# Patient Record
Sex: MALE | Race: WHITE | NOT HISPANIC OR LATINO | Employment: OTHER | URBAN - METROPOLITAN AREA
[De-identification: names, ages, dates, MRNs, and addresses within clinical notes are randomized per-mention and may not be internally consistent; named-entity substitution may affect disease eponyms.]

---

## 2023-09-05 PROBLEM — E55.9 MILD VITAMIN D DEFICIENCY: Status: ACTIVE | Noted: 2023-09-05

## 2023-09-05 PROBLEM — N41.9 PROSTATITIS: Status: ACTIVE | Noted: 2023-09-05

## 2023-09-05 PROBLEM — D50.9 ANEMIA, IRON DEFICIENCY: Status: ACTIVE | Noted: 2023-09-05

## 2023-09-05 PROBLEM — R55 EPISODE OF SYNCOPE: Status: ACTIVE | Noted: 2023-09-05

## 2023-09-05 PROBLEM — M17.0 DEGENERATIVE ARTHRITIS OF KNEE, BILATERAL: Status: ACTIVE | Noted: 2023-09-05

## 2023-09-05 PROBLEM — F41.9 ANXIETY: Status: ACTIVE | Noted: 2023-09-05

## 2023-09-05 PROBLEM — N40.0 BPH (BENIGN PROSTATIC HYPERPLASIA): Status: ACTIVE | Noted: 2023-09-05

## 2023-09-05 PROBLEM — R53.81 MALAISE AND FATIGUE: Status: ACTIVE | Noted: 2023-09-05

## 2023-09-05 PROBLEM — G56.21 CUBITAL TUNNEL SYNDROME ON RIGHT: Status: ACTIVE | Noted: 2023-09-05

## 2023-09-05 PROBLEM — I10 HYPERTENSION: Status: ACTIVE | Noted: 2023-09-05

## 2023-09-05 PROBLEM — E78.00 PURE HYPERCHOLESTEROLEMIA: Status: ACTIVE | Noted: 2023-09-05

## 2023-09-05 PROBLEM — I49.3 FREQUENT PVCS: Status: ACTIVE | Noted: 2023-09-05

## 2023-09-05 PROBLEM — M54.12 CERVICAL NEURITIS: Status: ACTIVE | Noted: 2023-09-05

## 2023-09-05 PROBLEM — G56.01 MILD CARPAL TUNNEL SYNDROME, RIGHT: Status: ACTIVE | Noted: 2023-09-05

## 2023-09-05 PROBLEM — R78.81 GRAM-NEGATIVE BACTEREMIA: Status: ACTIVE | Noted: 2023-09-05

## 2023-09-05 PROBLEM — M19.90 INFLAMMATORY ARTHRITIS: Status: ACTIVE | Noted: 2023-09-05

## 2023-09-05 PROBLEM — G56.31 NEUROPATHY OF RIGHT RADIAL NERVE: Status: ACTIVE | Noted: 2023-09-05

## 2023-09-05 PROBLEM — R53.83 MALAISE AND FATIGUE: Status: ACTIVE | Noted: 2023-09-05

## 2023-09-05 PROBLEM — K21.9 GERD (GASTROESOPHAGEAL REFLUX DISEASE): Status: ACTIVE | Noted: 2023-09-05

## 2023-09-05 RX ORDER — METHYLPREDNISOLONE ACETATE 40 MG/ML
INJECTION, SUSPENSION INTRA-ARTICULAR; INTRALESIONAL; INTRAMUSCULAR; SOFT TISSUE
COMMUNITY
Start: 2017-05-24 | End: 2023-10-11 | Stop reason: ALTCHOICE

## 2023-09-05 RX ORDER — LOSARTAN POTASSIUM 50 MG/1
1 TABLET ORAL DAILY
COMMUNITY
End: 2023-10-11 | Stop reason: ALTCHOICE

## 2023-09-05 RX ORDER — TAMSULOSIN HYDROCHLORIDE 0.4 MG/1
1 CAPSULE ORAL DAILY
COMMUNITY
Start: 2015-06-19

## 2023-09-05 RX ORDER — ACETAMINOPHEN 500 MG
TABLET ORAL
COMMUNITY
Start: 2021-06-18 | End: 2023-10-11 | Stop reason: ALTCHOICE

## 2023-09-05 RX ORDER — LISINOPRIL 5 MG/1
1 TABLET ORAL DAILY
COMMUNITY
Start: 2021-09-07 | End: 2023-10-11 | Stop reason: ALTCHOICE

## 2023-09-05 RX ORDER — NAPROXEN SODIUM 220 MG/1
TABLET, FILM COATED ORAL
COMMUNITY
Start: 2020-03-27 | End: 2023-10-11

## 2023-09-05 RX ORDER — SIMVASTATIN 20 MG/1
1 TABLET, FILM COATED ORAL DAILY
COMMUNITY
Start: 2015-02-19 | End: 2024-04-09 | Stop reason: WASHOUT

## 2023-09-05 RX ORDER — FUROSEMIDE 40 MG/1
1 TABLET ORAL DAILY
COMMUNITY
Start: 2022-02-10 | End: 2024-04-09 | Stop reason: SDUPTHER

## 2023-09-05 RX ORDER — GABAPENTIN 100 MG/1
CAPSULE ORAL
COMMUNITY
Start: 2021-02-26 | End: 2023-10-11 | Stop reason: ALTCHOICE

## 2023-09-05 RX ORDER — DEXAMETHASONE SODIUM PHOSPHATE 4 MG/ML
INJECTION, SOLUTION INTRA-ARTICULAR; INTRALESIONAL; INTRAMUSCULAR; INTRAVENOUS; SOFT TISSUE
COMMUNITY
Start: 2018-04-13 | End: 2023-10-11 | Stop reason: ALTCHOICE

## 2023-09-05 RX ORDER — METHOCARBAMOL 500 MG/1
TABLET, FILM COATED ORAL
COMMUNITY
Start: 2017-05-22 | End: 2023-10-11 | Stop reason: ALTCHOICE

## 2023-09-05 RX ORDER — CHOLECALCIFEROL (VITAMIN D3) 125 MCG
CAPSULE ORAL
COMMUNITY
End: 2023-09-06 | Stop reason: ENTERED-IN-ERROR

## 2023-10-11 ENCOUNTER — OFFICE VISIT (OUTPATIENT)
Dept: CARDIOLOGY | Facility: CLINIC | Age: 83
End: 2023-10-11
Payer: COMMERCIAL

## 2023-10-11 VITALS
HEIGHT: 68 IN | BODY MASS INDEX: 29.95 KG/M2 | HEART RATE: 54 BPM | DIASTOLIC BLOOD PRESSURE: 60 MMHG | WEIGHT: 197.6 LBS | SYSTOLIC BLOOD PRESSURE: 118 MMHG

## 2023-10-11 DIAGNOSIS — Z91.89 AT RISK FOR AMIODARONE TOXICITY WITH LONG TERM USE: ICD-10-CM

## 2023-10-11 DIAGNOSIS — Z79.899 AT RISK FOR AMIODARONE TOXICITY WITH LONG TERM USE: ICD-10-CM

## 2023-10-11 DIAGNOSIS — E78.00 PURE HYPERCHOLESTEROLEMIA: ICD-10-CM

## 2023-10-11 DIAGNOSIS — I10 PRIMARY HYPERTENSION: Primary | ICD-10-CM

## 2023-10-11 DIAGNOSIS — R06.02 SHORTNESS OF BREATH: ICD-10-CM

## 2023-10-11 DIAGNOSIS — I49.3 FREQUENT PVCS: ICD-10-CM

## 2023-10-11 PROBLEM — R29.898 RIGHT ARM WEAKNESS: Status: ACTIVE | Noted: 2019-08-30

## 2023-10-11 PROCEDURE — 1159F MED LIST DOCD IN RCRD: CPT | Performed by: INTERNAL MEDICINE

## 2023-10-11 PROCEDURE — 1160F RVW MEDS BY RX/DR IN RCRD: CPT | Performed by: INTERNAL MEDICINE

## 2023-10-11 PROCEDURE — 3074F SYST BP LT 130 MM HG: CPT | Performed by: INTERNAL MEDICINE

## 2023-10-11 PROCEDURE — 93000 ELECTROCARDIOGRAM COMPLETE: CPT | Performed by: INTERNAL MEDICINE

## 2023-10-11 PROCEDURE — 99215 OFFICE O/P EST HI 40 MIN: CPT | Performed by: INTERNAL MEDICINE

## 2023-10-11 PROCEDURE — 3078F DIAST BP <80 MM HG: CPT | Performed by: INTERNAL MEDICINE

## 2023-10-11 PROCEDURE — 1036F TOBACCO NON-USER: CPT | Performed by: INTERNAL MEDICINE

## 2023-10-11 RX ORDER — DICLOFENAC SODIUM 10 MG/G
100 GEL TOPICAL 2 TIMES DAILY PRN
COMMUNITY
Start: 2023-06-22

## 2023-10-11 RX ORDER — CARVEDILOL 3.12 MG/1
3.12 TABLET ORAL 2 TIMES DAILY
Qty: 180 TABLET | Refills: 3 | Status: SHIPPED | OUTPATIENT
Start: 2023-10-11 | End: 2024-04-09 | Stop reason: SDUPTHER

## 2023-10-11 RX ORDER — ASPIRIN 81 MG/1
81 TABLET ORAL DAILY
COMMUNITY
Start: 2023-04-06 | End: 2023-10-11

## 2023-10-11 ASSESSMENT — ENCOUNTER SYMPTOMS
DEPRESSION: 0
OCCASIONAL FEELINGS OF UNSTEADINESS: 1
LOSS OF SENSATION IN FEET: 0

## 2023-10-11 NOTE — ASSESSMENT & PLAN NOTE
This is currently well controlled.  I am going to reduce the dose of Coreg as he is somewhat bradycardic today and blood pressure is on the low side.  His target blood pressure is less than 130/80.

## 2023-10-11 NOTE — ASSESSMENT & PLAN NOTE
He was highly symptomatic with the PVCs with structurally normal heart and without LV dysfunction or ischemic heart disease.  Couple of hospitalization because of these symptoms we started him on a diuretic and amiodarone and this led to symptom resolution.  Continue current management.

## 2023-10-11 NOTE — PROGRESS NOTES
"Chief Complaint:   Follow-up (annual)     History Of Present Illness:    Marko Kay is a 83 y.o. male who was seen in the hospital for near syncope and shortness of breath.  Had a follow-up monitor that showed high frequency of PVCs but no other arrhythmia. I believe he had 16 to 18% burden of PVCs. However stress test echo were within normal limits.  We started him on a low-dose diuretic and amiodarone and this has led to symptom resolution.     Is here for follow-up.   ECG shows sinus bradycardia.  QTc 424 ms  Last Recorded Vitals:  Vitals:    10/11/23 1103   BP: 118/60   BP Location: Left arm   Pulse: 54   Weight: 89.6 kg (197 lb 9.6 oz)   Height: 1.727 m (5' 8\")       Past Medical History:  He has a past medical history of Dizziness and giddiness, Joint disorder, unspecified, Overweight, Personal history of other diseases of the musculoskeletal system and connective tissue (03/18/2015), Personal history of other diseases of the musculoskeletal system and connective tissue, Personal history of other endocrine, nutritional and metabolic disease (03/18/2015), and Unspecified acquired deformity of unspecified upper arm.    Past Surgical History:  He has a past surgical history that includes Cataract extraction (11/26/2014).      Social History:  He reports that he has never smoked. He has never used smokeless tobacco. He reports that he does not drink alcohol and does not use drugs.    Family History:  Family History   Problem Relation Name Age of Onset    Cancer Other Uncle         Allergies:  Patient has no known allergies.    Outpatient Medications:  Current Outpatient Medications   Medication Instructions    amiodarone (Pacerone) 200 mg tablet TAKE 2 TABLETS BY MOUTH EVERY 12 HOURS    aspirin 81 mg, oral, Daily    carvedilol (Coreg) 6.25 mg tablet TAKE 1 TABLET BY MOUTH TWO TIMES A DAY    diclofenac sodium (VOLTAREN) 100 g, Topical, 2 times daily PRN    furosemide (Lasix) 40 mg tablet 1 tablet, oral, Daily "    simvastatin (Zocor) 20 mg tablet 1 tablet, oral, Daily    tamsulosin (Flomax) 0.4 mg 24 hr capsule 1 capsule, oral, Daily       Physical Exam:  Physical Exam  Vitals reviewed.   Constitutional:       Appearance: Normal appearance.   Neck:      Vascular: No carotid bruit or JVD.   Cardiovascular:      Rate and Rhythm: Normal rate and regular rhythm.      Heart sounds: Normal heart sounds, S1 normal and S2 normal. No murmur heard.  Pulmonary:      Effort: Pulmonary effort is normal.      Breath sounds: Normal breath sounds.   Abdominal:      General: Abdomen is flat. Bowel sounds are normal.      Palpations: Abdomen is soft.   Musculoskeletal:      Right lower leg: No edema.      Left lower leg: No edema.   Skin:     General: Skin is warm.   Neurological:      Mental Status: He is alert. Mental status is at baseline.   Psychiatric:         Mood and Affect: Mood normal.         Behavior: Behavior normal.           Last Labs:  CBC -  Lab Results   Component Value Date    WBC 6.9 07/15/2023    HGB 13.8 07/15/2023    HCT 40.6 (L) 07/15/2023    MCV 94 07/15/2023     07/15/2023       CMP -  Lab Results   Component Value Date    CALCIUM 8.2 (L) 07/15/2023    PROT 6.3 (L) 07/11/2023    ALBUMIN 4.0 07/11/2023    AST 16 07/11/2023    ALT 20 07/11/2023    ALKPHOS 70 07/11/2023    BILITOT 0.9 07/11/2023       LIPID PANEL -   Lab Results   Component Value Date    CHOL 104 07/13/2023    TRIG 103 07/13/2023    HDL 35.9 (A) 07/13/2023    CHHDL 2.9 07/13/2023    LDLF 48 07/13/2023    VLDL 21 07/13/2023       RENAL FUNCTION PANEL -   Lab Results   Component Value Date    GLUCOSE 102 (H) 07/15/2023     07/15/2023    K 4.2 07/15/2023     (H) 07/15/2023    CO2 22 07/15/2023    ANIONGAP 11 07/15/2023    BUN 29 (H) 07/15/2023    CREATININE 1.06 07/15/2023    GFRMALE 69 07/15/2023    CALCIUM 8.2 (L) 07/15/2023    ALBUMIN 4.0 07/11/2023        Lab Results   Component Value Date    BNP 49 07/11/2023       Assessment/Plan    Problem List Items Addressed This Visit             ICD-10-CM    Hypertension - Primary I10     This is currently well controlled.  I am going to reduce the dose of Coreg as he is somewhat bradycardic today and blood pressure is on the low side.  His target blood pressure is less than 130/80.         Relevant Medications    carvedilol (Coreg) 3.125 mg tablet    Other Relevant Orders    ECG 12 Lead    Frequent PVCs I49.3     He was highly symptomatic with the PVCs with structurally normal heart and without LV dysfunction or ischemic heart disease.  Couple of hospitalization because of these symptoms we started him on a diuretic and amiodarone and this led to symptom resolution.  Continue current management.         Relevant Medications    carvedilol (Coreg) 3.125 mg tablet    Other Relevant Orders    ECG 12 Lead    Pure hypercholesterolemia E78.00     We will continue current dose of statins.  No prior ischemic event.  He has been on this medication for a while perhaps started by PCP.  Will continue.  Lipids are favorable.  I stopped the aspirin.         Relevant Orders    ECG 12 Lead    At risk for amiodarone toxicity with long term use Z91.89, Z79.899     We discussed long-term toxicities of the medication and the need for monitoring.  Close follow-up in office.         Relevant Medications    carvedilol (Coreg) 3.125 mg tablet    Other Relevant Orders    ECG 12 Lead    Shortness of breath R06.02     He had history of shortness of breath the etiology of which was not completely clear to us.  Could have been a combination of uncontrolled hypertension, diastolic heart failure or PVCs.  This has responded nicely with treatment with Lasix and amiodarone.  We will continue current regimen.  Blood pressure is currently well controlled we are reducing Coreg as above.              Bertha Zarco MD

## 2023-10-11 NOTE — ASSESSMENT & PLAN NOTE
He had history of shortness of breath the etiology of which was not completely clear to us.  Could have been a combination of uncontrolled hypertension, diastolic heart failure or PVCs.  This has responded nicely with treatment with Lasix and amiodarone.  We will continue current regimen.  Blood pressure is currently well controlled we are reducing Coreg as above.

## 2023-10-11 NOTE — ASSESSMENT & PLAN NOTE
We will continue current dose of statins.  No prior ischemic event.  He has been on this medication for a while perhaps started by PCP.  Will continue.  Lipids are favorable.  I stopped the aspirin.

## 2023-10-11 NOTE — ASSESSMENT & PLAN NOTE
We discussed long-term toxicities of the medication and the need for monitoring.  Close follow-up in office.

## 2023-10-11 NOTE — PATIENT INSTRUCTIONS
You are on amiodarone . This medicine is helping you stay in normal rhythm.  This is an important medication for you. However, when used over a long period of time can deposit in your liver, thyroid gland, lungs, eyes and cause problems.  It is important that we monitor you periodically for these above-mentioned side effects.  We will be doing periodic blood work and chest x-rays.  It is important that you see your eye doctor at least once a year and mention to him/her that you take amiodarone.

## 2024-04-02 PROBLEM — N39.0 LOWER URINARY TRACT INFECTIOUS DISEASE: Status: ACTIVE | Noted: 2024-04-02

## 2024-04-02 PROBLEM — M25.461 EFFUSION OF RIGHT KNEE: Status: ACTIVE | Noted: 2024-04-02

## 2024-04-02 PROBLEM — E66.3 OVERWEIGHT WITH BODY MASS INDEX (BMI) 25.0-29.9: Status: ACTIVE | Noted: 2024-04-02

## 2024-04-02 PROBLEM — R00.2 PALPITATIONS: Status: ACTIVE | Noted: 2024-04-02

## 2024-04-02 PROBLEM — R55 SYNCOPE: Status: ACTIVE | Noted: 2023-07-15

## 2024-04-02 PROBLEM — G56.01 CARPAL TUNNEL SYNDROME OF RIGHT WRIST: Status: ACTIVE | Noted: 2019-08-23

## 2024-04-02 PROBLEM — J40 BRONCHITIS, NOT SPECIFIED AS ACUTE OR CHRONIC: Status: ACTIVE | Noted: 2018-12-28

## 2024-04-02 PROBLEM — M54.2 NECK PAIN: Status: ACTIVE | Noted: 2023-09-05

## 2024-04-02 PROBLEM — N40.0 BENIGN PROSTATIC HYPERPLASIA: Status: ACTIVE | Noted: 2023-07-15

## 2024-04-02 PROBLEM — R05.9 COUGH: Status: ACTIVE | Noted: 2018-12-28

## 2024-04-02 PROBLEM — M25.529 ELBOW PAIN: Status: ACTIVE | Noted: 2024-04-02

## 2024-04-02 PROBLEM — M79.631 PAIN OF RIGHT FOREARM: Status: ACTIVE | Noted: 2024-04-02

## 2024-04-02 PROBLEM — Z86.39 HISTORY OF ELEVATED LIPIDS: Status: ACTIVE | Noted: 2024-04-02

## 2024-04-02 PROBLEM — M77.9 CAPSULITIS: Status: ACTIVE | Noted: 2024-04-02

## 2024-04-02 PROBLEM — Z86.16 PERSONAL HISTORY OF COVID-19: Status: ACTIVE | Noted: 2023-07-15

## 2024-04-02 PROBLEM — M25.462 EFFUSION OF LEFT KNEE: Status: ACTIVE | Noted: 2024-04-02

## 2024-04-02 PROBLEM — R09.89 PULMONARY CONGESTION: Status: ACTIVE | Noted: 2024-04-02

## 2024-04-02 PROBLEM — R42 DIZZINESS AND GIDDINESS: Status: ACTIVE | Noted: 2024-04-02

## 2024-04-02 PROBLEM — M19.90 ARTHRITIS: Status: ACTIVE | Noted: 2023-07-15

## 2024-04-08 NOTE — PROGRESS NOTES
Primary Cardiologist: Dr. Zarco    Chief Complaint:   Follow-up (6 month f/u)     History Of Present Illness:    Marko Kay is a 84 y.o. male with past medical history of HTN, DLD, PVCs, history of ventricular bigeminy with associated presyncope who is on amiodarone presents today for 6 month follow up.     Today, Marko Kay is feeling very well. He denies any cardiac concerns, no recent hospitalizations.   Denies chest pain/pressure, no dizziness or lightheadedness, no shortness of breath at rest but occasionally with exertion that has been stable, and no palpitations. He denies lower extremity edema, orthopnea or PND.     Last Recorded Vitals:  Visit Vitals  /68 (BP Location: Left arm)   Pulse 60   Smoking Status Never        Past Medical History:  He has a past medical history of Dizziness and giddiness, Joint disorder, unspecified, Overweight, Personal history of other diseases of the musculoskeletal system and connective tissue (03/18/2015), Personal history of other diseases of the musculoskeletal system and connective tissue, Personal history of other endocrine, nutritional and metabolic disease (03/18/2015), and Unspecified acquired deformity of unspecified upper arm.    Past Surgical History:  He has a past surgical history that includes Cataract extraction (11/26/2014).      Social History:  He reports that he has never smoked. He has never used smokeless tobacco. He reports that he does not drink alcohol and does not use drugs.    Family History:  Family History   Problem Relation Name Age of Onset    Cancer Other Uncle         Allergies:  Patient has no known allergies.    Outpatient Medications:  Current Outpatient Medications   Medication Instructions    amiodarone (Pacerone) 200 mg tablet TAKE 2 TABLETS BY MOUTH EVERY 12 HOURS    atorvastatin (LIPITOR) 20 mg    calcium citrate 250 mg calcium tablet     carvedilol (COREG) 3.125 mg, oral, 2 times daily    cyanocobalamin (VITAMIN  B-12) 1,000 mcg, oral, Daily    diclofenac sodium (VOLTAREN) 100 g, Topical, 2 times daily PRN    furosemide (Lasix) 40 mg tablet 1 tablet, oral, Daily    losartan (Cozaar) 25 mg tablet     melatonin 10 mg, oral, Nightly    simvastatin (Zocor) 20 mg tablet 1 tablet, oral, Daily    tamsulosin (Flomax) 0.4 mg 24 hr capsule 1 capsule, oral, Daily         Review of Systems   Constitutional: Negative for malaise/fatigue.   HENT: Negative.     Cardiovascular:  Positive for dyspnea on exertion (chronic, stable). Negative for chest pain, leg swelling, orthopnea and palpitations.   Respiratory:  Negative for shortness of breath.    Endocrine: Negative.    Hematologic/Lymphatic: Bruises/bleeds easily.   Skin: Negative.    Musculoskeletal:         Using rollator   Gastrointestinal: Negative.    Genitourinary: Negative.    Neurological:  Positive for dizziness (chronic, stable).        Physical Exam  Vitals reviewed.   Constitutional:       Appearance: Normal appearance.   HENT:      Head: Normocephalic.      Mouth/Throat:      Mouth: Mucous membranes are moist.   Cardiovascular:      Rate and Rhythm: Normal rate and regular rhythm.      Pulses: Normal pulses.      Heart sounds: Normal heart sounds.   Pulmonary:      Effort: Pulmonary effort is normal.      Breath sounds: Normal breath sounds. No wheezing, rhonchi or rales.   Abdominal:      General: Bowel sounds are normal. There is no distension.      Palpations: Abdomen is soft.      Tenderness: There is no abdominal tenderness.   Musculoskeletal:      Cervical back: Normal range of motion.      Right lower leg: Edema (trace) present.      Left lower leg: Edema (trace) present.   Skin:     General: Skin is warm and dry.   Neurological:      General: No focal deficit present.      Mental Status: He is alert and oriented to person, place, and time.   Psychiatric:         Mood and Affect: Mood normal.         Behavior: Behavior normal.           Last Labs:  CBC -  Lab Results  "  Component Value Date    WBC 6.9 07/15/2023    HGB 13.8 07/15/2023    HCT 40.6 (L) 07/15/2023    MCV 94 07/15/2023     07/15/2023       CMP -  Lab Results   Component Value Date    CALCIUM 8.2 (L) 07/15/2023    PROT 6.3 (L) 07/11/2023    ALBUMIN 4.0 07/11/2023    AST 16 07/11/2023    ALT 20 07/11/2023    ALKPHOS 70 07/11/2023    BILITOT 0.9 07/11/2023       LIPID PANEL -   Lab Results   Component Value Date    CHOL 104 07/13/2023    TRIG 103 07/13/2023    HDL 35.9 (A) 07/13/2023    CHHDL 2.9 07/13/2023    LDLF 48 07/13/2023    VLDL 21 07/13/2023       RENAL FUNCTION PANEL -   Lab Results   Component Value Date    GLUCOSE 102 (H) 07/15/2023     07/15/2023    K 4.2 07/15/2023     (H) 07/15/2023    CO2 22 07/15/2023    ANIONGAP 11 07/15/2023    BUN 29 (H) 07/15/2023    CREATININE 1.06 07/15/2023    GFRMALE 69 07/15/2023    CALCIUM 8.2 (L) 07/15/2023    ALBUMIN 4.0 07/11/2023        Lab Results   Component Value Date    BNP 49 07/11/2023       Last Cardiology Tests:  ECG:  Sinus Rhythm rate of 60 bpm, qt/qtc 426 ms. This will go to Dr. Zarco for review.       Echo:  TTE  7/12/23:  Left ventricular systolic function is normal with a 55% estimated ejection fraction.     Ejection Fractions:  No results found for: \"EF\"    Cath:  [facility] 7/14/23:  Mild non-obstructive coronary artery disease.   Stress Test:  NM Stress Test  IMPRESSION:  1. Fixed perfusion defect as noted above. No reversible perfusion  defects seen. There is a low probability of ischemia.  2. Calculated ejection fraction 54% without segmental wall motion  abnormality seen.  Cardiac Imaging:  No results found for this or any previous visit from the past 1095 days.        Lab review: I have personally reviewed the laboratory result(s)     Assessment/Plan     Marko Kay is a 84 y.o. male with past medical history of HTN, DLD, PVCs, history of ventricular bigeminy with associated presyncope who is on amiodarone presents today for 6 " month follow up.     History of PVCs/Ventricular Bigeminy  With associated presyncope  TTE  7/12/23: Left ventricular systolic function is normal with a 55% estimated ejection fraction.  [facility] 7/14/23: Mild non-obstructive coronary artery disease.   History of symptomatic PVCs that resolved with diuretic and amiodarone  Repeat labs done 4/5 reviewed. AST/ALT, CR and TSH normal. CXR done 4/4 read as normal, no acute findings  Today, denies any palpitations.   Continue on Amiodarone, carvedilol  6 month Amio labs ordered prior to next visit.     Hypertension  Today's /68  Goal BP less than 130/80  Continue on current antihypertensives    Dyslipidemia  Lipid panel 7/13/2023: Cholesterol 104 HDL 35 LDL 48 and triglycerides of 103  Continue on statin    History of shortness of breath  Stable, controlled  No significant edema noted  Continue on furosemide, stable kidney function and potassium on recent labs    Maxine Lagunas, APRN-CNP

## 2024-04-09 ENCOUNTER — APPOINTMENT (OUTPATIENT)
Dept: CARDIOLOGY | Facility: CLINIC | Age: 84
End: 2024-04-09
Payer: COMMERCIAL

## 2024-04-09 ENCOUNTER — OFFICE VISIT (OUTPATIENT)
Dept: CARDIOLOGY | Facility: CLINIC | Age: 84
End: 2024-04-09
Payer: COMMERCIAL

## 2024-04-09 VITALS — HEART RATE: 60 BPM | SYSTOLIC BLOOD PRESSURE: 128 MMHG | DIASTOLIC BLOOD PRESSURE: 68 MMHG

## 2024-04-09 DIAGNOSIS — I49.3 FREQUENT PVCS: ICD-10-CM

## 2024-04-09 DIAGNOSIS — Z91.89 AT RISK FOR AMIODARONE TOXICITY WITH LONG TERM USE: ICD-10-CM

## 2024-04-09 DIAGNOSIS — Z79.899 AT RISK FOR AMIODARONE TOXICITY WITH LONG TERM USE: ICD-10-CM

## 2024-04-09 DIAGNOSIS — E78.00 PURE HYPERCHOLESTEROLEMIA: ICD-10-CM

## 2024-04-09 DIAGNOSIS — I10 PRIMARY HYPERTENSION: Primary | ICD-10-CM

## 2024-04-09 DIAGNOSIS — Z86.39 HISTORY OF ELEVATED LIPIDS: ICD-10-CM

## 2024-04-09 PROCEDURE — 1036F TOBACCO NON-USER: CPT | Performed by: CLINICAL NURSE SPECIALIST

## 2024-04-09 PROCEDURE — 1160F RVW MEDS BY RX/DR IN RCRD: CPT | Performed by: CLINICAL NURSE SPECIALIST

## 2024-04-09 PROCEDURE — 99214 OFFICE O/P EST MOD 30 MIN: CPT | Performed by: CLINICAL NURSE SPECIALIST

## 2024-04-09 PROCEDURE — 1159F MED LIST DOCD IN RCRD: CPT | Performed by: CLINICAL NURSE SPECIALIST

## 2024-04-09 PROCEDURE — 3074F SYST BP LT 130 MM HG: CPT | Performed by: CLINICAL NURSE SPECIALIST

## 2024-04-09 PROCEDURE — 93000 ELECTROCARDIOGRAM COMPLETE: CPT | Performed by: INTERNAL MEDICINE

## 2024-04-09 PROCEDURE — 3078F DIAST BP <80 MM HG: CPT | Performed by: CLINICAL NURSE SPECIALIST

## 2024-04-09 RX ORDER — ATORVASTATIN CALCIUM 20 MG/1
20 TABLET, FILM COATED ORAL
COMMUNITY
Start: 2024-03-05 | End: 2024-04-09 | Stop reason: SDUPTHER

## 2024-04-09 RX ORDER — ACETAMINOPHEN, DIPHENHYDRAMINE HCL, PHENYLEPHRINE HCL 325; 25; 5 MG/1; MG/1; MG/1
10 TABLET ORAL NIGHTLY
COMMUNITY

## 2024-04-09 RX ORDER — ATORVASTATIN CALCIUM 20 MG/1
20 TABLET, FILM COATED ORAL DAILY
Qty: 90 TABLET | Refills: 3 | Status: SHIPPED | OUTPATIENT
Start: 2024-04-09 | End: 2025-04-09

## 2024-04-09 RX ORDER — FUROSEMIDE 40 MG/1
40 TABLET ORAL DAILY
Qty: 90 TABLET | Refills: 3 | Status: SHIPPED | OUTPATIENT
Start: 2024-04-09 | End: 2025-04-09

## 2024-04-09 RX ORDER — CARVEDILOL 3.12 MG/1
3.12 TABLET ORAL 2 TIMES DAILY
Qty: 180 TABLET | Refills: 3 | Status: SHIPPED | OUTPATIENT
Start: 2024-04-09 | End: 2025-04-09

## 2024-04-09 RX ORDER — LANOLIN ALCOHOL/MO/W.PET/CERES
1000 CREAM (GRAM) TOPICAL DAILY
COMMUNITY

## 2024-04-09 RX ORDER — AMIODARONE HYDROCHLORIDE 200 MG/1
200 TABLET ORAL DAILY
Qty: 90 TABLET | Refills: 3 | Status: SHIPPED | OUTPATIENT
Start: 2024-04-09 | End: 2025-04-09

## 2024-04-09 RX ORDER — PSEUDOEPHEDRINE HCL 30 MG
TABLET ORAL
COMMUNITY
Start: 2024-03-05

## 2024-04-09 RX ORDER — LOSARTAN POTASSIUM 25 MG/1
25 TABLET ORAL 2 TIMES DAILY
Qty: 180 TABLET | Refills: 3 | Status: SHIPPED | OUTPATIENT
Start: 2024-04-09 | End: 2025-04-09

## 2024-04-09 RX ORDER — LOSARTAN POTASSIUM 25 MG/1
TABLET ORAL
COMMUNITY
Start: 2024-03-05 | End: 2024-04-09 | Stop reason: SDUPTHER

## 2024-04-09 ASSESSMENT — ENCOUNTER SYMPTOMS
ENDOCRINE NEGATIVE: 1
GASTROINTESTINAL NEGATIVE: 1
OCCASIONAL FEELINGS OF UNSTEADINESS: 1
BRUISES/BLEEDS EASILY: 1
DIZZINESS: 1
ORTHOPNEA: 0
LOSS OF SENSATION IN FEET: 0
DEPRESSION: 0
PALPITATIONS: 0
SHORTNESS OF BREATH: 0
DYSPNEA ON EXERTION: 1

## 2024-04-09 NOTE — PATIENT INSTRUCTIONS
Labs have been ordered to be done prior to your next appointment in October  Call our office with any new cardiac concerns  Continue current medications  Continue heart-healthy diet. A diet low in sodium, low in cholesterol, limiting red meats and eating whole foods.   Follow up with Dr. Zarco in October as scheduled.

## 2024-10-08 ENCOUNTER — TELEPHONE (OUTPATIENT)
Dept: CARDIOLOGY | Facility: HOSPITAL | Age: 84
End: 2024-10-08
Payer: COMMERCIAL

## 2024-10-08 NOTE — TELEPHONE ENCOUNTER
We had a voicemail form his nurse there.  He thinks he has an appointment with Dr. Zarco on 10/16 and he needs a chest xray.  Looks like his visit was cancelled back in April.  He is due for a yearly visit with Dr. Zarco and is also due for a yearly chest xray.  He will get rescheduled.

## 2024-10-09 NOTE — TELEPHONE ENCOUNTER
Called [facility] to let them know we got him scheduled for 10/15 @ 1:40 pm.

## 2024-10-15 ENCOUNTER — OFFICE VISIT (OUTPATIENT)
Dept: CARDIOLOGY | Facility: HOSPITAL | Age: 84
End: 2024-10-15
Payer: COMMERCIAL

## 2024-10-15 VITALS
WEIGHT: 196.2 LBS | HEIGHT: 68 IN | BODY MASS INDEX: 29.73 KG/M2 | DIASTOLIC BLOOD PRESSURE: 78 MMHG | SYSTOLIC BLOOD PRESSURE: 134 MMHG | HEART RATE: 56 BPM

## 2024-10-15 DIAGNOSIS — I49.3 FREQUENT PVCS: Primary | ICD-10-CM

## 2024-10-15 DIAGNOSIS — E78.00 PURE HYPERCHOLESTEROLEMIA: ICD-10-CM

## 2024-10-15 DIAGNOSIS — Z79.899 AT RISK FOR AMIODARONE TOXICITY WITH LONG TERM USE: ICD-10-CM

## 2024-10-15 DIAGNOSIS — Z79.899 ON AMIODARONE THERAPY: ICD-10-CM

## 2024-10-15 DIAGNOSIS — I10 PRIMARY HYPERTENSION: ICD-10-CM

## 2024-10-15 DIAGNOSIS — Z91.89 AT RISK FOR AMIODARONE TOXICITY WITH LONG TERM USE: ICD-10-CM

## 2024-10-15 PROCEDURE — 99215 OFFICE O/P EST HI 40 MIN: CPT | Performed by: INTERNAL MEDICINE

## 2024-10-15 PROCEDURE — 93005 ELECTROCARDIOGRAM TRACING: CPT | Performed by: INTERNAL MEDICINE

## 2024-10-15 RX ORDER — FUROSEMIDE 20 MG/1
20 TABLET ORAL DAILY
Qty: 90 TABLET | Refills: 3 | Status: SHIPPED | OUTPATIENT
Start: 2024-10-15 | End: 2024-10-15 | Stop reason: SDUPTHER

## 2024-10-15 RX ORDER — FUROSEMIDE 20 MG/1
20 TABLET ORAL DAILY
Qty: 90 TABLET | Refills: 3 | Status: SHIPPED | OUTPATIENT
Start: 2024-10-15 | End: 2025-10-15

## 2024-10-15 ASSESSMENT — ENCOUNTER SYMPTOMS
OCCASIONAL FEELINGS OF UNSTEADINESS: 1
DEPRESSION: 0
LOSS OF SENSATION IN FEET: 0

## 2024-10-15 NOTE — PROGRESS NOTES
"Chief Complaint:   No chief complaint on file.     History Of Present Illness:    Marko Kay is a 84 y.o. male presenting for annual follow-up.    He was seen in the past in the hospital for near syncope and shortness of breath.  Had a follow-up monitor that showed high frequency of PVCs but no other arrhythmia. I believe he had 16 to 18% burden of PVCs. However stress test echo were within normal limits.  We started him on a low-dose diuretic and amiodarone and this has led to symptom resolution.  At this time he is doing well but continues to have mild shortness of breath with strenuous physical activities.     [facility] 2023- normal coornaries  ECG shows sinus bradycardia.  QTc 440 ms.     Last Recorded Vitals:  Vitals:    10/15/24 1353   BP: 134/78   BP Location: Right arm   Pulse: 56   Weight: 89 kg (196 lb 3.2 oz)   Height: 1.727 m (5' 8\")       Past Medical History:  He has a past medical history of Dizziness and giddiness, Joint disorder, unspecified, Overweight, Personal history of other diseases of the musculoskeletal system and connective tissue (03/18/2015), Personal history of other diseases of the musculoskeletal system and connective tissue, Personal history of other endocrine, nutritional and metabolic disease (03/18/2015), and Unspecified acquired deformity of unspecified upper arm.    Past Surgical History:  He has a past surgical history that includes Cataract extraction (11/26/2014).      Social History:  He reports that he has never smoked. He has never used smokeless tobacco. He reports that he does not drink alcohol and does not use drugs.    Family History:  Family History   Problem Relation Name Age of Onset   • Cancer Other Uncle         Allergies:  Patient has no known allergies.    Outpatient Medications:  Current Outpatient Medications   Medication Instructions   • amiodarone (PACERONE) 200 mg, oral, Daily   • atorvastatin (LIPITOR) 20 mg, oral, Daily   • calcium citrate 250 mg calcium " tablet    • carvedilol (COREG) 3.125 mg, oral, 2 times daily   • cyanocobalamin (VITAMIN B-12) 1,000 mcg, oral, Daily   • diclofenac sodium (VOLTAREN) 100 g, Topical, 2 times daily PRN   • furosemide (LASIX) 40 mg, oral, Daily   • losartan (COZAAR) 25 mg, oral, 2 times daily   • melatonin 10 mg, oral, Nightly   • tamsulosin (Flomax) 0.4 mg 24 hr capsule 1 capsule, oral, Daily       Physical Exam:  Physical Exam  Vitals reviewed.   Constitutional:       Appearance: Normal appearance.   Neck:      Vascular: No carotid bruit or JVD.   Cardiovascular:      Rate and Rhythm: Normal rate and regular rhythm.      Heart sounds: Normal heart sounds, S1 normal and S2 normal. No murmur heard.  Pulmonary:      Effort: Pulmonary effort is normal.      Breath sounds: Normal breath sounds.   Abdominal:      General: Abdomen is flat. Bowel sounds are normal.      Palpations: Abdomen is soft.   Musculoskeletal:      Right lower leg: No edema.      Left lower leg: No edema.   Skin:     General: Skin is warm.   Neurological:      Mental Status: He is alert. Mental status is at baseline.   Psychiatric:         Mood and Affect: Mood normal.         Behavior: Behavior normal.         Last Labs:  CBC -  Lab Results   Component Value Date    WBC 6.9 07/15/2023    HGB 13.8 07/15/2023    HCT 40.6 (L) 07/15/2023    MCV 94 07/15/2023     07/15/2023       CMP -  Lab Results   Component Value Date    CALCIUM 8.2 (L) 07/15/2023    PROT 6.3 (L) 07/11/2023    ALBUMIN 4.0 07/11/2023    AST 16 07/11/2023    ALT 20 07/11/2023    ALKPHOS 70 07/11/2023    BILITOT 0.9 07/11/2023       LIPID PANEL -   Lab Results   Component Value Date    CHOL 104 07/13/2023    TRIG 103 07/13/2023    HDL 35.9 (A) 07/13/2023    CHHDL 2.9 07/13/2023    LDLF 48 07/13/2023    VLDL 21 07/13/2023       RENAL FUNCTION PANEL -   Lab Results   Component Value Date    GLUCOSE 102 (H) 07/15/2023     07/15/2023    K 4.2 07/15/2023     (H) 07/15/2023    CO2 22  "07/15/2023    ANIONGAP 11 07/15/2023    BUN 29 (H) 07/15/2023    CREATININE 1.06 07/15/2023    GFRMALE 69 07/15/2023    CALCIUM 8.2 (L) 07/15/2023    ALBUMIN 4.0 07/11/2023        Lab Results   Component Value Date    BNP 49 07/11/2023       Last Cardiology Tests:  ECG:  ECG 12 Lead 04/30/2024      Echo:  No results found for this or any previous visit from the past 1095 days.      Ejection Fractions:  No results found for: \"EF\"    Cath:  No results found for this or any previous visit from the past 1095 days.      Stress Test:  Nuclear Stress Test 07/12/2023      Cardiac Imaging:  No results found for this or any previous visit from the past 1095 days.          Assessment/Plan   1-hypertension-this is currently well controlled. His target blood pressure is less than 130/80.  Continue current management.      2-symptomatic PVCs with high burden-he was highly symptomatic with the PVCs with structurally normal heart and without LV dysfunction or ischemic heart disease.  Couple of hospitalization because of these symptoms we started him on a diuretic and amiodarone and this led to symptom resolution.  Continue current management.  Reduce diuretic dose due to worsening renal function and hypokalemia.      3-hyperlipidemia-we will continue current dose of statins. No prior ischemic event. He has been on this medication for a while perhaps started by PCP. Will continue. Lipids are favorable. I stopped the aspirin.       4-long-term toxicity risk with amiodarone-we discussed long-term toxicities of the medication and the need for monitoring. Close follow-up in office.  Reviewed LFTs and TSH are normal.  Chest x-ray pending reminded him to get this and the eye check done regularly.    Bertha Zarco MD  "

## 2024-10-16 ENCOUNTER — APPOINTMENT (OUTPATIENT)
Dept: CARDIOLOGY | Facility: CLINIC | Age: 84
End: 2024-10-16
Payer: COMMERCIAL

## 2024-10-18 ENCOUNTER — APPOINTMENT (OUTPATIENT)
Dept: CARDIOLOGY | Facility: CLINIC | Age: 84
End: 2024-10-18
Payer: COMMERCIAL

## 2025-04-08 DIAGNOSIS — I10 PRIMARY HYPERTENSION: ICD-10-CM

## 2025-04-09 RX ORDER — ATORVASTATIN CALCIUM 40 MG/1
40 TABLET, FILM COATED ORAL DAILY
COMMUNITY
Start: 2025-02-26

## 2025-04-11 ENCOUNTER — LAB REQUISITION (OUTPATIENT)
Dept: LAB | Facility: HOSPITAL | Age: 85
End: 2025-04-11
Payer: COMMERCIAL

## 2025-04-11 DIAGNOSIS — I10 ESSENTIAL (PRIMARY) HYPERTENSION: ICD-10-CM

## 2025-04-11 DIAGNOSIS — N40.1 BENIGN PROSTATIC HYPERPLASIA WITH LOWER URINARY TRACT SYMPTOMS: ICD-10-CM

## 2025-04-11 DIAGNOSIS — D64.9 ANEMIA, UNSPECIFIED: ICD-10-CM

## 2025-04-11 LAB
ALBUMIN SERPL BCP-MCNC: 3.7 G/DL (ref 3.4–5)
ALP SERPL-CCNC: 73 U/L (ref 33–136)
ALT SERPL W P-5'-P-CCNC: 35 U/L (ref 10–52)
ALT SERPL W P-5'-P-CCNC: 35 U/L (ref 10–52)
ANION GAP SERPL CALC-SCNC: 11 MMOL/L (ref 10–20)
AST SERPL W P-5'-P-CCNC: 28 U/L (ref 9–39)
AST SERPL W P-5'-P-CCNC: 28 U/L (ref 9–39)
BILIRUB SERPL-MCNC: 0.6 MG/DL (ref 0–1.2)
BUN SERPL-MCNC: 24 MG/DL (ref 6–23)
CALCIUM SERPL-MCNC: 8.7 MG/DL (ref 8.6–10.3)
CHLORIDE SERPL-SCNC: 109 MMOL/L (ref 98–107)
CO2 SERPL-SCNC: 24 MMOL/L (ref 21–32)
CREAT SERPL-MCNC: 1.15 MG/DL (ref 0.5–1.3)
EGFRCR SERPLBLD CKD-EPI 2021: 62 ML/MIN/1.73M*2
ERYTHROCYTE [DISTWIDTH] IN BLOOD BY AUTOMATED COUNT: 14.2 % (ref 11.5–14.5)
GLUCOSE SERPL-MCNC: 92 MG/DL (ref 74–99)
HCT VFR BLD AUTO: 43.6 % (ref 41–52)
HGB BLD-MCNC: 14.3 G/DL (ref 13.5–17.5)
MCH RBC QN AUTO: 32.1 PG (ref 26–34)
MCHC RBC AUTO-ENTMCNC: 32.8 G/DL (ref 32–36)
MCV RBC AUTO: 98 FL (ref 80–100)
NRBC BLD-RTO: 0 /100 WBCS (ref 0–0)
PLATELET # BLD AUTO: 174 X10*3/UL (ref 150–450)
POTASSIUM SERPL-SCNC: 4.4 MMOL/L (ref 3.5–5.3)
PROT SERPL-MCNC: 5.5 G/DL (ref 6.4–8.2)
RBC # BLD AUTO: 4.45 X10*6/UL (ref 4.5–5.9)
SODIUM SERPL-SCNC: 140 MMOL/L (ref 136–145)
WBC # BLD AUTO: 6.3 X10*3/UL (ref 4.4–11.3)

## 2025-04-11 PROCEDURE — 84460 ALANINE AMINO (ALT) (SGPT): CPT | Mod: OUT | Performed by: INTERNAL MEDICINE

## 2025-04-11 PROCEDURE — 85027 COMPLETE CBC AUTOMATED: CPT | Mod: OUT | Performed by: INTERNAL MEDICINE

## 2025-04-11 PROCEDURE — 84450 TRANSFERASE (AST) (SGOT): CPT | Mod: OUT | Performed by: INTERNAL MEDICINE

## 2025-04-15 ENCOUNTER — OFFICE VISIT (OUTPATIENT)
Dept: CARDIOLOGY | Facility: HOSPITAL | Age: 85
End: 2025-04-15
Payer: COMMERCIAL

## 2025-04-15 VITALS
HEART RATE: 62 BPM | HEIGHT: 68 IN | DIASTOLIC BLOOD PRESSURE: 76 MMHG | SYSTOLIC BLOOD PRESSURE: 130 MMHG | OXYGEN SATURATION: 96 % | WEIGHT: 197 LBS | BODY MASS INDEX: 29.86 KG/M2

## 2025-04-15 DIAGNOSIS — I49.3 FREQUENT PVCS: Primary | ICD-10-CM

## 2025-04-15 DIAGNOSIS — I10 PRIMARY HYPERTENSION: ICD-10-CM

## 2025-04-15 PROCEDURE — 99213 OFFICE O/P EST LOW 20 MIN: CPT | Performed by: PHYSICIAN ASSISTANT

## 2025-04-15 PROCEDURE — 1159F MED LIST DOCD IN RCRD: CPT | Performed by: PHYSICIAN ASSISTANT

## 2025-04-15 PROCEDURE — 3075F SYST BP GE 130 - 139MM HG: CPT | Performed by: PHYSICIAN ASSISTANT

## 2025-04-15 PROCEDURE — 1160F RVW MEDS BY RX/DR IN RCRD: CPT | Performed by: PHYSICIAN ASSISTANT

## 2025-04-15 PROCEDURE — 3078F DIAST BP <80 MM HG: CPT | Performed by: PHYSICIAN ASSISTANT

## 2025-04-15 PROCEDURE — 1036F TOBACCO NON-USER: CPT | Performed by: PHYSICIAN ASSISTANT

## 2025-04-15 RX ORDER — ACETAMINOPHEN 500 MG
TABLET ORAL DAILY
COMMUNITY

## 2025-04-15 RX ORDER — ACETAMINOPHEN 325 MG/1
TABLET ORAL EVERY 6 HOURS PRN
COMMUNITY

## 2025-04-15 ASSESSMENT — ENCOUNTER SYMPTOMS
DIARRHEA: 0
VOMITING: 0
ABDOMINAL PAIN: 0
NAUSEA: 0
WHEEZING: 0
WEAKNESS: 0
ORTHOPNEA: 0
DYSURIA: 0
SHORTNESS OF BREATH: 0
PALPITATIONS: 0
FEVER: 0

## 2025-04-15 NOTE — PATIENT INSTRUCTIONS
Please continue your current medications.  If you have any change in your cardiorespiratory status please call the office right away.  Please keep your yearly appointment with Dr. Zarco in October.

## 2025-04-15 NOTE — PROGRESS NOTES
"Cardiology Follow Up  Chief Complaint:   Patient is here for 6 month office visit.      History Of Present Illness:    Marko Kay is a 84 y.o. male presenting for annual follow-up.     He was seen in the past in the hospital for near syncope and shortness of breath.  Had a follow-up monitor that showed high frequency of PVCs but no other arrhythmia. I believe he had 16 to 18% burden of PVCs. However stress test echo were within normal limits.  We started him on a low-dose diuretic and amiodarone and this has led to symptom resolution.  At this time he is doing well but continues to have mild shortness of breath with strenuous physical activities.     [facility] 2023- normal coornaries  ECG shows sinus bradycardia.  QTc 440 ms.  4-15-25: Is a very pleasant 85-year-old patient known to Dr. Zarco.  Above history as noted.  Patient is at assisted living and feels excellent and remains active without chest pain shortness of breath lightheadedness dizziness syncope or falling.  Taking his medications as prescribed and again feels great with no complaints or concerns recently just had a battery of tests including CBC CMP and LFTs that were normal.  He is awaiting further studies on thyroid and he is scheduled for a chest x-ray prior to his next visit with Dr. Fabian Rivera.       Last Recorded Vitals:  Vitals:    04/15/25 1332   BP: 130/76   BP Location: Left arm   Patient Position: Sitting   BP Cuff Size: Adult   Pulse: 62   SpO2: 96%   Weight: 89.4 kg (197 lb)   Height: 1.727 m (5' 8\")       Past Medical History:  He has a past medical history of Dizziness and giddiness, Joint disorder, unspecified, Overweight, Personal history of other diseases of the musculoskeletal system and connective tissue (03/18/2015), Personal history of other diseases of the musculoskeletal system and connective tissue, Personal history of other endocrine, nutritional and metabolic disease (03/18/2015), and Unspecified acquired deformity of " unspecified upper arm.    Past Surgical History:  He has a past surgical history that includes Cataract extraction (11/26/2014).      Social History:  He reports that he has never smoked. He has never used smokeless tobacco. He reports that he does not drink alcohol and does not use drugs.    Family History:  Family History   Problem Relation Name Age of Onset    Cancer Other Uncle         Allergies:  Patient has no known allergies.    Outpatient Medications:  Current Outpatient Medications   Medication Instructions    acetaminophen (Tylenol) 325 mg tablet Every 6 hours PRN    amiodarone (PACERONE) 200 mg, oral, Daily    atorvastatin (LIPITOR) 20 mg, oral, Daily    atorvastatin (LIPITOR) 40 mg, Daily    calcium citrate 250 mg calcium tablet     carvedilol (COREG) 3.125 mg, oral, 2 times daily    cholecalciferol (Vitamin D3) 125 mcg (5,000 units) tablet Daily    cyanocobalamin (VITAMIN B-12) 1,000 mcg, Daily    diclofenac sodium (VOLTAREN) 100 g, 2 times daily PRN    furosemide (LASIX) 20 mg, oral, Daily    losartan (COZAAR) 25 mg, oral, 2 times daily    melatonin 10 mg, Nightly    tamsulosin (Flomax) 0.4 mg 24 hr capsule 1 capsule, Daily     Review of Systems   Constitutional: Negative for fever and malaise/fatigue.        Overall feels well;  living at assisted living facility   Cardiovascular:  Negative for chest pain, orthopnea and palpitations.   Respiratory:  Negative for shortness of breath and wheezing.    Skin:  Negative for itching and rash.   Gastrointestinal:  Negative for abdominal pain, diarrhea, nausea and vomiting.   Genitourinary:  Negative for dysuria.   Neurological:  Negative for weakness.      Physical Exam  Constitutional:       General: He is not in acute distress.     Appearance: Normal appearance.   HENT:      Mouth/Throat:      Mouth: Mucous membranes are moist.   Neck:      Comments: No JVD  Cardiovascular:      Rate and Rhythm: Normal rate and regular rhythm.      Heart sounds: Normal  heart sounds. No murmur heard.  Pulmonary:      Effort: Pulmonary effort is normal.      Breath sounds: Normal breath sounds.   Abdominal:      General: Abdomen is flat. Bowel sounds are normal.      Palpations: Abdomen is soft.   Musculoskeletal:         General: No swelling.   Skin:     General: Skin is warm and dry.   Neurological:      Mental Status: He is alert and oriented to person, place, and time.   Psychiatric:         Mood and Affect: Mood normal.           Last Labs:  CBC -  Lab Results   Component Value Date    WBC 6.3 04/11/2025    HGB 14.3 04/11/2025    HCT 43.6 04/11/2025    MCV 98 04/11/2025     04/11/2025       CMP -  Lab Results   Component Value Date    CALCIUM 8.7 04/11/2025    PROT 5.5 (L) 04/11/2025    ALBUMIN 3.7 04/11/2025    AST 28 04/11/2025    AST 28 04/11/2025    ALT 35 04/11/2025    ALT 35 04/11/2025    ALKPHOS 73 04/11/2025    BILITOT 0.6 04/11/2025       LIPID PANEL -   Lab Results   Component Value Date    CHOL 104 07/13/2023    TRIG 103 07/13/2023    HDL 35.9 (A) 07/13/2023    CHHDL 2.9 07/13/2023    LDLF 48 07/13/2023    VLDL 21 07/13/2023       RENAL FUNCTION PANEL -   Lab Results   Component Value Date    GLUCOSE 92 04/11/2025     04/11/2025    K 4.4 04/11/2025     (H) 04/11/2025    CO2 24 04/11/2025    ANIONGAP 11 04/11/2025    BUN 24 (H) 04/11/2025    CREATININE 1.15 04/11/2025    GFRMALE 69 07/15/2023    CALCIUM 8.7 04/11/2025    ALBUMIN 3.7 04/11/2025        Lab Results   Component Value Date    BNP 49 07/11/2023       Last Cardiology Tests:    Echo:  CONCLUSIONS:  1. Left ventricular systolic function is normal with a 55% estimated ejection fraction.    Cath:  2023--CONCLUSIONS:  1. Mild non-obstructive coronary artery disease.    Stress Test: IMPRESSION:  1. Fixed perfusion defect as noted above. No reversible perfusion  defects seen. There is a low probability of ischemia.  2. Calculated ejection fraction 54% without segmental wall motion  abnormality  seen.         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  His breathing is stable and he voices no cardiac complaints or concerns.  Blood pressures are controlled and we will continue current medical therapy.  History of hypertension--again blood pressure is well-controlled on current meds.  History of PVCs--the patient has no awareness of palpitation or irregular heartbeats.  Overall patient feeling well with no new cardiac complaints or concerns.  Should to be any changes instructed to contact the office otherwise he is already scheduled back with Dr. Zarco in October.      Sean Wyatt PA-C  4/15/2025  1:46 PM

## 2025-04-21 RX ORDER — LOSARTAN POTASSIUM 25 MG/1
TABLET ORAL
Qty: 180 TABLET | Refills: 1 | Status: SHIPPED | OUTPATIENT
Start: 2025-04-21

## 2025-04-23 ENCOUNTER — TELEPHONE (OUTPATIENT)
Dept: CARDIOLOGY | Facility: HOSPITAL | Age: 85
End: 2025-04-23
Payer: COMMERCIAL

## 2025-04-23 NOTE — TELEPHONE ENCOUNTER
Facility where patient is living called to update pharmacy. No refills needed at this time, Pharmacy added to patient chart. [facility].